# Patient Record
Sex: MALE | Race: WHITE | URBAN - NONMETROPOLITAN AREA
[De-identification: names, ages, dates, MRNs, and addresses within clinical notes are randomized per-mention and may not be internally consistent; named-entity substitution may affect disease eponyms.]

---

## 2021-09-29 ENCOUNTER — OFFICE VISIT (OUTPATIENT)
Dept: URBAN - NONMETROPOLITAN AREA CLINIC 4 | Facility: CLINIC | Age: 51
End: 2021-09-29
Payer: COMMERCIAL

## 2021-09-29 ENCOUNTER — WEB ENCOUNTER (OUTPATIENT)
Dept: URBAN - NONMETROPOLITAN AREA CLINIC 4 | Facility: CLINIC | Age: 51
End: 2021-09-29

## 2021-09-29 ENCOUNTER — DASHBOARD ENCOUNTERS (OUTPATIENT)
Age: 51
End: 2021-09-29

## 2021-09-29 DIAGNOSIS — K92.1 HEMATOCHEZIA: ICD-10-CM

## 2021-09-29 DIAGNOSIS — Z80.0 FAMILY HISTORY OF COLON CANCER IN FATHER: ICD-10-CM

## 2021-09-29 DIAGNOSIS — R10.30 LOWER ABDOMINAL PAIN: ICD-10-CM

## 2021-09-29 DIAGNOSIS — K62.89 RECTAL PAIN: ICD-10-CM

## 2021-09-29 PROCEDURE — 99244 OFF/OP CNSLTJ NEW/EST MOD 40: CPT | Performed by: INTERNAL MEDICINE

## 2021-09-29 RX ORDER — ALPRAZOLAM 0.5 MG/1
1 TABLET TABLET ORAL TWICE A DAY
Status: ACTIVE | COMMUNITY

## 2021-09-29 RX ORDER — SODIUM SULFATE, MAGNESIUM SULFATE, AND POTASSIUM CHLORIDE 17.75; 2.7; 2.25 G/1; G/1; G/1
12 TABLETS TABLET ORAL
Qty: 24 TABLETS | Refills: 0 | OUTPATIENT
Start: 2021-09-29 | End: 2021-09-30

## 2021-09-29 NOTE — HPI-TODAY'S VISIT:
Patient is a 49 yo man referred by Dr. Aiden Garza for above reasons. A copy of this document will be sent to referring provider. He c/o abdominal pain, rectal pain and bleeding x 6 months. His symptoms are intermittent with symtom free intervals of few weeks. He was diagnosed with hemorrhoids and treated with suppositories with suboptimal results. No use of blood thinners or NSAID's. He has never had a colonoscopy. His father had CRC in his 50's. He has COPD and anxiety disorder. No heart disease. He smokes 1 ppd x 30 years.

## 2021-09-29 NOTE — PHYSICAL EXAM GASTROINTESTINAL
Abdomen , soft, nontender, nondistended ,healed lower abdominal scar,  no guarding or rigidity , no masses palpable , normal bowel sounds , Liver and Spleen , no hepatomegaly present , no hepatosplenomegaly , liver nontender , spleen not palpable

## 2021-10-19 ENCOUNTER — OFFICE VISIT (OUTPATIENT)
Dept: URBAN - METROPOLITAN AREA SURGERY CENTER 14 | Facility: SURGERY CENTER | Age: 51
End: 2021-10-19
Payer: COMMERCIAL

## 2021-10-19 DIAGNOSIS — K92.1 ACUTE MELENA: ICD-10-CM

## 2021-10-19 PROCEDURE — G8907 PT DOC NO EVENTS ON DISCHARG: HCPCS | Performed by: INTERNAL MEDICINE

## 2021-10-19 PROCEDURE — 45378 DIAGNOSTIC COLONOSCOPY: CPT | Performed by: INTERNAL MEDICINE
